# Patient Record
(demographics unavailable — no encounter records)

---

## 2017-05-23 NOTE — PD
HPI


Chief Complaint:  Pregnancy Related Problem


Time Seen by Provider:  12:52


Travel History


International Travel<30 days:  No


Contact w/Intl Traveler<30days:  No


Traveled to known affect area:  No





History of Present Illness


HPI


Patient is a 20-year-old female who presents the emergency department with 

complaint of back pain.  Patient states that she is approximately one month 

pregnant based on LMP.  She was seen at [facility] last night 

with low back pain and concerns for pregnancy.  She is a , normal vaginal 

delivery at term with her first pregnancy.  She has not had any vaginal bleeding

, leakage of fluid.  Patient states that last night at [facility] 

her pregnancy hormone level was 75 and she had an ultrasound but was not told 

the results of this.





[facility]


Past Medical History


Diminished Hearing:  No


Immunizations Current:  No


Influenza Vaccination:  No


Pregnant?:  Pregnant


LMP:  4/15/2017





Past Surgical History


Other Surgery:  Yes (LAC REPAIR TO HEAD AND LEG)





Social History


Alcohol Use:  No


Tobacco Use:  No


Substance Use:  No





Allergies-Medications


(Allergen,Severity, Reaction):  


Coded Allergies:  


     Caffeine (Verified  Allergy, Intermediate, PSORIASIS / BREAKOUTS., 17)


     Iodinated Contrast Media (Verified  Allergy, Intermediate, RASH, 17)


     Tomato (Verified  Adverse Reaction, Unknown, 17)





Review of Systems


Except as stated in HPI:  all other systems reviewed are Neg





Physical Exam


Narrative


GENERAL: Well-appearing female in no acute distress


SKIN: Focused skin assessment warm/dry.


HEAD:  Normocephalic. 


EYES:  No scleral icterus. No injection or drainage. 


ENT:   Mucous membranes pink and moist.


NECK: Supple


CARDIOVASCULAR: Regular rate and rhythm.  


RESPIRATORY: No accessory muscle use.


GASTROINTESTINAL: Abdomen soft, non-tender, nondistended. 


MUSCULOSKELETAL: No obvious deformities.  No edema. 


NEUROLOGICAL: Awake and alert.  Normal speech.


PSYCHIATRIC: Appropriate mood and affect; insight and judgment normal.





Data


Data


Last Documented VS





Vital Signs








  Date Time  Temp Pulse Resp B/P Pulse Ox O2 Delivery O2 Flow Rate FiO2


 


17 12:40 98.2 84 16 131/71 99   








Orders





 Ed Poc Ultrasound (17 )


Beta Hcg (Quant/Titer) (17 13:03)


Urinalysis - C+S If Indicated (17 13:03)





Labs





 Laboratory Tests








Test 17





 13:10


 


Urine Color YELLOW 


 


Urine Turbidity HAZY 


 


Urine pH 6.5 


 


Urine Specific Gravity 1.028 


 


Urine Protein TRACE mg/dL


 


Urine Glucose (UA) NEG mg/dL


 


Urine Ketones NEG mg/dL


 


Urine Occult Blood NEG 


 


Urine Nitrite NEG 


 


Urine Bilirubin NEG 


 


Urine Urobilinogen LESS THAN 2.0





 MG/DL


 


Urine Leukocyte Esterase TRACE 


 


Urine RBC 1 /hpf


 


Urine WBC 2 /hpf


 


Urine Squamous Epithelial 15 /hpf





Cells 


 


Urine Mucus FEW /lpf


 


Microscopic Urinalysis Comment CULT NOT





 INDICATED


 


Human Chorionic Gonadotropin, 72 MIU/ML





Quant 











MDM


Medical Decision Making


Medical Screen Exam Complete:  Yes


Emergency Medical Condition:  Yes


Medical Record Reviewed:  Yes


Differential Diagnosis


20-year-old female  at approximately one month gestational age based on 

LMP here with complaint of low back pain radiating slightly into the abdomen 

without any vaginal bleeding.  Differential includes pregnancy, ectopic 

pregnancy, threatened AB, missed AB, UTI.  Overall her abdominal examination is 

benign and my suspicion for peritoneal pathology is exceedingly low.


Narrative Course


Analysis was unremarkable.  Beta Quant was 72.  Records from outside hospital 

were obtained showing a beta Quant just in the last 12 hours was 75 and she had 

a transvaginal ultrasound showing no evidence of intrauterine pregnancy or 

extrauterine pregnancy.  Patient was reassured and encouraged to follow-up in 

48 hours for repeat beta Quant.





Procedures


**Procedure Narrative**


Emergency Department Pelvic ultrasound was performed with patient consent.





The curvilinear probe was used in the transverse and sagittal views within the 

suprapubic region revealing no evidence of intrauterine pregnancy.





Diagnosis





 Primary Impression:  


 Abdominal pain affecting pregnancy


Referrals:  


Obstetrician


2 days





***Additional Instructions:


Follow-up in 48 hours with OB/GYN for repeat pregnancy hormone level.  Her 

pregnancy hormone level was 75.


***Med/Other Pt SpecificInfo:  No Change to Meds


Disposition:  01 DISCHARGE HOME


Condition:  Stable








Bertha Vale MD May 23, 2017 14:33

## 2017-05-25 NOTE — PD
HPI


Chief Complaint:  Pregnancy Related Problem


Time Seen by Provider:  11:25


Travel History


International Travel<30 days:  No


Contact w/Intl Traveler<30days:  No


Traveled to known affect area:  No





History of Present Illness


HPI


20-year-old female  1 para 2 believed to be 4 weeks pregnant arrives for 

a repeat beta.  She had crampy pelvic pain 2 days ago.  It has since resolved.  

No back pain reported nausea is reported.  No vomiting or fever.  No vaginal 

bleeding or discharge.  Beta hCG obtained prior to prior visit was 74 with an 

ultrasound revealing no entry or extrauterine pregnancy.  On the prior visit 

the beta hCG was 75 and a bedside ultrasound revealed no intrauterine pregnancy.





[facility]


Past Medical History


Medical History:  Denies Significant Hx


Diminished Hearing:  No


Immunizations Current:  No


Tetanus Vaccination:  < 5 Years


Influenza Vaccination:  No


Pregnant?:  Pregnant


LMP:  4/15/2017


:  2


Para:  1


Miscarriage:  0


:  0





Past Surgical History


Other Surgery:  Yes (LAC REPAIR TO HEAD AND LEG)





Social History


Alcohol Use:  No


Tobacco Use:  No


Substance Use:  No





Allergies-Medications


(Allergen,Severity, Reaction):  


Coded Allergies:  


     Caffeine (Verified  Allergy, Intermediate, PSORIASIS / BREAKOUTS., 17)


     Iodinated Contrast Media (Verified  Allergy, Intermediate, RASH, 17)


     Latex (Verified  Allergy, Mild, rash, 17)


     Tomato (Verified  Adverse Reaction, Unknown, 17)


Reported Meds & Prescriptions





Reported Meds & Active Scripts


Active


No Active Prescriptions or Reported Medications    








Review of Systems


Except as stated in HPI:  all other systems reviewed are Neg


General / Constitutional:  No: Fever





Physical Exam


Narrative


GENERAL: 20-year-old female no acute distress well-nourished well-developed


SKIN: Focused skin assessment warm/dry.


HEAD: Atraumatic. Normocephalic. 


EYES: Pupils equal and round. No scleral icterus. No injection or drainage. 


ENT: No nasal bleeding or discharge.  Mucous membranes pink and moist.


NECK: Trachea midline. No JVD. 


CARDIOVASCULAR: Regular rate and rhythm.  No murmur appreciated.


RESPIRATORY: No accessory muscle use. Clear to auscultation. Breath sounds 

equal bilaterally. 


GASTROINTESTINAL: Abdomen soft, non-tender, nondistended. Hepatic and splenic 

margins not palpable. 


MUSCULOSKELETAL: No obvious deformities. No clubbing.  No cyanosis.  No edema. 


NEUROLOGICAL: Awake and alert. No obvious cranial nerve deficits.  Motor 

grossly within normal limits. Normal speech.


PSYCHIATRIC: Appropriate mood and affect; insight and judgment normal.





Data


Data


Last Documented VS





Vital Signs








  Date Time  Temp Pulse Resp B/P Pulse Ox O2 Delivery O2 Flow Rate FiO2


 


17 11:19 98.5 76 20 132/69 99 Room Air  








Orders





 Beta Hcg (Quant/Titer) (17 11:30)





Labs





 Laboratory Tests








Test 17





 11:40


 


Human Chorionic Gonadotropin, 73 MIU/ML





Quant 











[facility]


Medical Decision Making


Medical Screen Exam Complete:  Yes


Emergency Medical Condition:  Yes


Medical Record Reviewed:  Yes


Differential Diagnosis


IUP, UTI, ectopic pregnancy, ov torsion, appendicitis, TOA, cervicitis, BV, 

Trichomoniasis, ov cyst, hernia, mittelschmerz, pain from menstruation


Narrative Course


The 48 hour repeat beta today is 73.  It has not increased in 2 days.  Patient 

will need to follow-up with obstetrics.  There is concern for an inevitable 

.





Diagnosis





 Primary Impression:  


 Elevated serum hCG


Referrals:  


Shahrzad Lockhart MD


2 days





***Additional Instructions:


You have a choice when it comes to health care, and we are glad that you chose 

[facility]. Hopefully, we have met your expectations on today's visit.  You 

are welcome to return to [facility] at any time, as we are committed to 

meeting the health care needs of our community.


***Med/Other Pt SpecificInfo:  No Change to Meds


Scripts


No Active Prescriptions or Reported Meds


Disposition:  01 DISCHARGE HOME


Condition:  Stable








Bakari Stewart MD May 25, 2017 12:42